# Patient Record
Sex: MALE | Race: WHITE | Employment: OTHER | URBAN - METROPOLITAN AREA
[De-identification: names, ages, dates, MRNs, and addresses within clinical notes are randomized per-mention and may not be internally consistent; named-entity substitution may affect disease eponyms.]

---

## 2020-08-05 ENCOUNTER — VIRTUAL VISIT (OUTPATIENT)
Dept: FAMILY MEDICINE | Facility: OTHER | Age: 70
End: 2020-08-05

## 2020-08-06 NOTE — PROGRESS NOTES
"Date: 2020 17:08:01  Clinician: Josse Anderson  Clinician NPI: [id number]  Patient: Mason Gan  Patient : 1950  Patient Address: [address] [place] [address] [address] [place] [place] [address]  Patient Phone: [phone]  Visit Protocol: URI  Patient Summary:  Mason is a 70 year old ( : 1950 ) male who initiated a Visit for COVID-19 (Coronavirus) evaluation and screening. When asked the question \"Please sign me up to receive news, health information and promotions. \", Mason responded \"Yes\".    When asked when his symptoms started, Mason reported that he does not have any symptoms.   He denies taking antibiotic medication in the past month and having recent facial or sinus surgery in the past 60 days.    Pertinent COVID-19 (Coronavirus) information  In the past 14 days, Mason has not worked in a congregate living setting.   He does not work or volunteer as healthcare worker or a  and does not work or volunteer in a healthcare facility.   Mason also has not lived in a congregate living setting in the past 14 days. He does not live with a healthcare worker.   Mason has not had a close contact with a laboratory-confirmed COVID-19 patient within the last 14 days.   Since 2019, Mason and has not had upper respiratory infection or influenza-like illness. Has not been diagnosed with lab-confirmed COVID-19 test   Pertinent medical history  Mason does not need a return to work/school note.   Weight: 175 lbs   Mason does not smoke or use smokeless tobacco.   Weight: 175 lbs    MEDICATIONS: pantoprazole oral, ALLERGIES: NKDA  Clinician Response:  Dear Mason Cuevas,  Can't tell your concern from your notes.  [facility] tests patients with symptoms or those having had an exposure. If this fits, contact the scheduling number below.  BIANCA Anderson MD.      on your exposure to COVID-19 (coronavirus), we would like to test you for this virus.   1. Please call [phone] to schedule your visit. Explain that " [facility]-19 [facility]. Be ready to share your [facility] visit ID number.  The following will serve as your written order for this COVID Test, ordered by me, for the indication of suspected COVID [Z20.828]: The test will be ordered in [employer], our electronic health record, after you are scheduled. It will show as ordered and authorized by Jong Garcia MD.  Order: COVID-19 (coronavirus) PCR for ASYMPTOMATIC EXPOSURE testing from [facility].  If you know you have had close contact with someone who tested positive, you should be quarantined for 14 days after this exposure. You should stay in quarantine for the14 days even if the covid test is negative, the optimal time to test after exposure is 5-7 days from the exposure  Quarantine means   What should I do?  For safety, it's very important to follow these rules. Do this for 14 days after the date you were last exposed to the virus..  Stay home and away from others. Don't go to school or anywhere else. Generally quarantine means staying home from work but there are some exceptions to this. Please contact your workplace.   No hugging, kissing or shaking hands.  Don't let anyone visit.  Cover your mouth and nose with a mask, tissue or washcloth to avoid spreading germs.  Wash your hands and face often. Use soap and water.  What are the symptoms of COVID-19?  The most common symptoms are cough, fever and trouble breathing. Less common symptoms include headache, body aches, fatigue (feeling very tired), chills, sore throat, stuffy or runny nose, diarrhea (loose poop), loss of taste or smell, belly pain, and nausea or vomiting (feeling sick to your stomach or throwing up).  After 14 days, if you have still don't have symptoms, you likely don't have this virus.  If you develop symptoms, follow these guidelines.  If you're normally healthy: Please start another [facility] visit to report your symptoms. Go to [email or web address].  If you have a serious health problem  (like cancer, heart failure, an organ transplant or kidney disease): Call your specialty clinic. Let them know that you might have COVID-19.  2. When it's time for your COVID test:  Stay at least 6 feet away from others. (If someone will drive you to your test, stay in the backseat, as far away from the  as you can.)  Cover your mouth and nose with a mask, tissue or washcloth.  Go straight to the testing site. Don't make any stops on the way there or back.  Please note  Caregivers in these groups are at risk for severe illness due to COVID-19:  o People 65 years and older  o People who live in a nursing home or long-term care facility  o People with chronic disease (lung, heart, cancer, diabetes, kidney, liver, immunologic)  o People who have a weakened immune system, including those who:  Are in cancer treatment  Take medicine that weakens the immune system, such as corticosteroids  Had a bone marrow or organ transplant  Have an immune deficiency  Have poorly controlled HIV or AIDS  Are obese (body mass index of 40 or higher)  Smoke regularly  Where can I get more information?  [facility] -- About COVID-19: [email or web address]  CDC -- What to Do If You're Sick: www.cdc.gov/coronavirus/2019-ncov/about/steps-when-sick.html  [facility] -- Ending Home Isolation: www.cdc.gov/coronavirus/2019-ncov/hcp/disposition-in-home-patients.html  [facility] -- Caring for Someone: www.cdc.gov/coronavirus/2019-ncov/if-you-are-sick/care-for-someone.html  [facility] -- Interim Guidance for Hospital Discharge to Home: www.health.[facility].[place]/diseases/coronavirus/hcp/hospdischarge.pdf  [facility] clinical trials (COVID-19 research studies): clinicalaffairs.[facility]/umn-clinical-trials  Below are the COVID-19 hotlines at the [place] Department of Health ([facility]). Interpreters are available.  For health questions: Call 742-310-4777 or 1-329.672.5828 (7 a.m. to 7 p.m.)  For questions about schools and childcare: Call  [phone] or 1-243.307.8820 (7 a.m. to 7 p.m.)  Mason,  Can't tell your concern from your notes.  [facility] tests patients with symptoms or those having had an exposure. If this fits, contact the scheduling number below.  BIANCA Anderson MD.          Diagnosis: Cough  Diagnosis ICD: R05

## 2020-08-21 DIAGNOSIS — Z20.822 EXPOSURE TO COVID-19 VIRUS: Primary | ICD-10-CM

## 2020-08-22 DIAGNOSIS — Z20.822 EXPOSURE TO COVID-19 VIRUS: ICD-10-CM

## 2020-08-22 PROCEDURE — U0003 INFECTIOUS AGENT DETECTION BY NUCLEIC ACID (DNA OR RNA); SEVERE ACUTE RESPIRATORY SYNDROME CORONAVIRUS 2 (SARS-COV-2) (CORONAVIRUS DISEASE [COVID-19]), AMPLIFIED PROBE TECHNIQUE, MAKING USE OF HIGH THROUGHPUT TECHNOLOGIES AS DESCRIBED BY CMS-2020-01-R: HCPCS | Performed by: FAMILY MEDICINE

## 2020-08-23 LAB
SARS-COV-2 RNA SPEC QL NAA+PROBE: NOT DETECTED
SPECIMEN SOURCE: NORMAL

## 2020-08-24 ENCOUNTER — NURSE TRIAGE (OUTPATIENT)
Dept: NURSING | Facility: CLINIC | Age: 70
End: 2020-08-24

## 2020-08-24 NOTE — TELEPHONE ENCOUNTER
Mason is calling for Covid results.       Coronavirus (COVID-19) Notification    Lab Result   Lab test 2019-nCoV rRt-PCR OR SARS-COV-2 PCR    Nasopharyngeal AND/OR Oropharyngeal swab is NEGATIVE for 2019-nCoV RNA [OR] SARS-COV-2 RNA (COVID-19) RNA    Your result was negative. This means that we didn't find the virus that causes COVID-19 in your sample. A test may show negative when you do actually have the virus. This can happen when the virus is in the early stages of infection, before you feel illness symptoms.    If you have symptoms   Stay home and away from others (self-isolate) until you meet ALL of the guidelines below:    You've had no fever--and no medicine that reduces fever--for 3 full days (72 hours). And      Your other symptoms have gotten better. For example, your cough or breathing has improved. And     At least 10 days have passed since your symptoms started.    During this time:    Stay home. Don't go to work, school or anywhere else.     Stay in your own room, including for meals. Use your own bathroom if you can.    Stay away from others in your home. No hugging, kissing or shaking hands. No visitors.    Clean  high touch  surfaces often (doorknobs, counters, handles, etc.). Use a household cleaning spray or wipes. You can find a full list on the EPA website at www.epa.gov/pesticide-registration/list-n-disinfectants-use-against-sars-cov-2.    Cover your mouth and nose with a mask, tissue or washcloth to avoid spreading germs.    Wash your hands and face often with soap and water.    Going back to work  Check with your employer for any guidelines to follow for going back to work.  You are sent a letter for your Employer which will serve as formal document notice that you, the employee, tested negative for COVID-19, as of the testing date shown above.    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    Where can I get more  information?    [facility]: [email or web address]    Coronavirus Basics: www.health.[facility].[place]./diseases/coronavirus/basics.html    [facility] Hotline ([phone])    {Name]  Kalina Ramirez RN/FNA

## 2021-01-04 ENCOUNTER — HEALTH MAINTENANCE LETTER (OUTPATIENT)
Age: 71
End: 2021-01-04

## 2021-10-11 ENCOUNTER — HEALTH MAINTENANCE LETTER (OUTPATIENT)
Age: 71
End: 2021-10-11

## 2022-01-30 ENCOUNTER — HEALTH MAINTENANCE LETTER (OUTPATIENT)
Age: 72
End: 2022-01-30

## 2022-09-24 ENCOUNTER — HEALTH MAINTENANCE LETTER (OUTPATIENT)
Age: 72
End: 2022-09-24

## 2023-05-08 ENCOUNTER — HEALTH MAINTENANCE LETTER (OUTPATIENT)
Age: 73
End: 2023-05-08